# Patient Record
Sex: FEMALE | Race: WHITE | NOT HISPANIC OR LATINO
[De-identification: names, ages, dates, MRNs, and addresses within clinical notes are randomized per-mention and may not be internally consistent; named-entity substitution may affect disease eponyms.]

---

## 2018-05-07 PROBLEM — Z00.00 ENCOUNTER FOR PREVENTIVE HEALTH EXAMINATION: Status: ACTIVE | Noted: 2018-05-07

## 2018-05-08 ENCOUNTER — APPOINTMENT (OUTPATIENT)
Dept: ORTHOPEDIC SURGERY | Facility: CLINIC | Age: 68
End: 2018-05-08
Payer: MEDICARE

## 2018-05-08 ENCOUNTER — OUTPATIENT (OUTPATIENT)
Dept: OUTPATIENT SERVICES | Facility: HOSPITAL | Age: 68
LOS: 1 days | End: 2018-05-08
Payer: MEDICARE

## 2018-05-08 VITALS — HEIGHT: 62 IN | WEIGHT: 138 LBS | OXYGEN SATURATION: 99 % | BODY MASS INDEX: 25.4 KG/M2

## 2018-05-08 PROCEDURE — 99203 OFFICE O/P NEW LOW 30 MIN: CPT | Mod: 25

## 2018-05-08 PROCEDURE — 73562 X-RAY EXAM OF KNEE 3: CPT

## 2018-05-08 PROCEDURE — 20610 DRAIN/INJ JOINT/BURSA W/O US: CPT | Mod: LT

## 2018-05-08 PROCEDURE — 73562 X-RAY EXAM OF KNEE 3: CPT | Mod: 26,50

## 2018-06-03 ENCOUNTER — TRANSCRIPTION ENCOUNTER (OUTPATIENT)
Age: 68
End: 2018-06-03

## 2018-07-10 ENCOUNTER — APPOINTMENT (OUTPATIENT)
Dept: ORTHOPEDIC SURGERY | Facility: CLINIC | Age: 68
End: 2018-07-10
Payer: MEDICARE

## 2018-07-10 VITALS — BODY MASS INDEX: 25.4 KG/M2 | WEIGHT: 138 LBS | HEIGHT: 62 IN

## 2018-07-10 PROCEDURE — 99213 OFFICE O/P EST LOW 20 MIN: CPT

## 2018-08-26 ENCOUNTER — FORM ENCOUNTER (OUTPATIENT)
Age: 68
End: 2018-08-26

## 2018-08-27 ENCOUNTER — OUTPATIENT (OUTPATIENT)
Dept: OUTPATIENT SERVICES | Facility: HOSPITAL | Age: 68
LOS: 1 days | End: 2018-08-27
Payer: MEDICARE

## 2018-08-27 ENCOUNTER — APPOINTMENT (OUTPATIENT)
Dept: ORTHOPEDIC SURGERY | Facility: CLINIC | Age: 68
End: 2018-08-27
Payer: MEDICARE

## 2018-08-27 VITALS — BODY MASS INDEX: 25.4 KG/M2 | HEIGHT: 62 IN | WEIGHT: 138 LBS

## 2018-08-27 PROCEDURE — 73562 X-RAY EXAM OF KNEE 3: CPT | Mod: 26,RT

## 2018-08-27 PROCEDURE — 99213 OFFICE O/P EST LOW 20 MIN: CPT | Mod: 25

## 2018-08-27 PROCEDURE — 73562 X-RAY EXAM OF KNEE 3: CPT

## 2018-08-27 PROCEDURE — 20610 DRAIN/INJ JOINT/BURSA W/O US: CPT | Mod: RT

## 2019-03-26 ENCOUNTER — APPOINTMENT (OUTPATIENT)
Dept: ORTHOPEDIC SURGERY | Facility: CLINIC | Age: 69
End: 2019-03-26
Payer: MEDICARE

## 2019-03-26 VITALS — WEIGHT: 138 LBS | HEIGHT: 62 IN | BODY MASS INDEX: 25.4 KG/M2

## 2019-03-26 PROCEDURE — 99213 OFFICE O/P EST LOW 20 MIN: CPT | Mod: 25

## 2019-03-26 PROCEDURE — 20610 DRAIN/INJ JOINT/BURSA W/O US: CPT | Mod: RT

## 2019-03-26 RX ORDER — DICLOFENAC SODIUM 10 MG/G
1 GEL TOPICAL DAILY
Qty: 2 | Refills: 0 | Status: ACTIVE | COMMUNITY
Start: 2019-03-26 | End: 1900-01-01

## 2019-03-26 RX ORDER — MELOXICAM 15 MG/1
15 TABLET ORAL DAILY
Qty: 30 | Refills: 1 | Status: ACTIVE | COMMUNITY
Start: 2019-03-26 | End: 1900-01-01

## 2019-03-27 NOTE — ASSESSMENT
[FreeTextEntry1] : Assessment\par #1 right knee arthritis, bone-on-bone in the medial compartment\par \par Plan\par #1 right knee was injected as described above\par #2 Voltaren gel and Mobic both sent to pharmacy\par #3 followup in 3 months or sooner p.r.n....

## 2019-03-27 NOTE — PROCEDURE
[de-identified] : After obtaining verbal consent and under the normal sterile conditions the right knee joint was injected with a solution of 4 cc 1% lidocaine and 1 cc of 40 mg per mL of Kenalog. The patient tolerated the procedure well.

## 2019-03-27 NOTE — HISTORY OF PRESENT ILLNESS
[de-identified] : 68-year-old lady following up for her right knee, she had an injection in August she did great until January, the pain has returned and is now radiating into the shin, it's worse with weightbearing ambulation and sitting for greater than 20 minutes moderate to severe at times. Better with rest. Requesting a joint injection today.

## 2019-03-27 NOTE — PHYSICAL EXAM
[de-identified] : General: Not in acute distress, dressed appropriately, sitting on examination table\par Skin: Warm and dry, normal turgor, no rashes\par Neurological: AOx3, Cranial nerves grossly in tact\par Psych: Mood and affect appropriate\par \par Focused Examination of the right knee : \par Erythematous or drainage. Tender medially. Range of motion zero 110. Stable. Assessment strength. Ambulate with a normal gait without devices.

## 2019-07-23 ENCOUNTER — FORM ENCOUNTER (OUTPATIENT)
Age: 69
End: 2019-07-23

## 2019-07-24 ENCOUNTER — APPOINTMENT (OUTPATIENT)
Dept: ORTHOPEDIC SURGERY | Facility: CLINIC | Age: 69
End: 2019-07-24
Payer: MEDICARE

## 2019-07-24 ENCOUNTER — OUTPATIENT (OUTPATIENT)
Dept: OUTPATIENT SERVICES | Facility: HOSPITAL | Age: 69
LOS: 1 days | End: 2019-07-24
Payer: MEDICARE

## 2019-07-24 DIAGNOSIS — M17.10 UNILATERAL PRIMARY OSTEOARTHRITIS, UNSPECIFIED KNEE: ICD-10-CM

## 2019-07-24 PROCEDURE — 73562 X-RAY EXAM OF KNEE 3: CPT | Mod: 26,50

## 2019-07-24 PROCEDURE — 73562 X-RAY EXAM OF KNEE 3: CPT

## 2019-07-24 PROCEDURE — 99213 OFFICE O/P EST LOW 20 MIN: CPT

## 2019-07-25 PROBLEM — M17.10 ARTHRITIS OF KNEE: Status: ACTIVE | Noted: 2018-05-18

## 2019-07-25 NOTE — HISTORY OF PRESENT ILLNESS
[de-identified] : This is a 69-year-old female with right severe knee arthritis. She is here today for followup after a chronic history of knee pain. She states that she has difficulty with walking as well as with stairs. She has tried Voltaren gel which has mildly helped the pain. She also has tried half a tablet of Mobic but she is hesitant and taking anti-inflammatory medications. She also has had an injection by [facility] approximately 5 months ago. She states injection worked for approximately 2 months which was shorter than the previous injection she had. She would like to discuss other options besides surgery.

## 2019-07-25 NOTE — PHYSICAL EXAM
[de-identified] : Physical examination of the right knee demonstrates the patient has 0-130° range of motion. She has medial joint line tenderness on the right side. She has a mild effusion on the right knee compared to the left. Patient is stable to varus and valgus stress. She has no back pain as well as no hip pain with internal/external rotation. Rest of the distal exam is within normal limits [de-identified] : X-rays of the right knee demonstrates the patient has bone-on-bone arthritis of the right knee. She has severe sclerosis as well as joint space narrowing.

## 2019-07-25 NOTE — ASSESSMENT
[FreeTextEntry1] : This is a 69-year-old female with right knee bone-on-bone medial arthritis\par Plan: We are going to continue with anti-inflammatory medications including Mobic she has a supply at home.She's going to try and continue exercises at home. She is going to followup p.r.n.\par \par All medical record entries made by the PA/Scribe/Fellow are at my, Dr. Issa Owens's direction and personally dictated by me on 07/24/2019]. I have reviewed the chart and agree that the record accurately reflects my personal performance of the history, physical exam, assessment, and plan. I have also personally directed reviewed, and agreed with the chart.\par

## 2019-07-30 ENCOUNTER — TRANSCRIPTION ENCOUNTER (OUTPATIENT)
Age: 69
End: 2019-07-30

## 2023-04-18 ENCOUNTER — LABORATORY RESULT (OUTPATIENT)
Age: 73
End: 2023-04-18

## 2023-04-18 ENCOUNTER — APPOINTMENT (OUTPATIENT)
Dept: OBGYN | Facility: CLINIC | Age: 73
End: 2023-04-18
Payer: MEDICARE

## 2023-04-18 VITALS — SYSTOLIC BLOOD PRESSURE: 144 MMHG | BODY MASS INDEX: 24.88 KG/M2 | WEIGHT: 136 LBS | DIASTOLIC BLOOD PRESSURE: 86 MMHG

## 2023-04-18 DIAGNOSIS — Z01.419 ENCOUNTER FOR GYNECOLOGICAL EXAMINATION (GENERAL) (ROUTINE) W/OUT ABNORMAL FINDINGS: ICD-10-CM

## 2023-04-18 LAB
BILIRUB UR QL STRIP: NORMAL
GLUCOSE UR-MCNC: NORMAL
HCG UR QL: 0.2 EU/DL
HGB UR QL STRIP.AUTO: NORMAL
KETONES UR-MCNC: NORMAL
LEUKOCYTE ESTERASE UR QL STRIP: NORMAL
NITRITE UR QL STRIP: POSITIVE
PH UR STRIP: 7
PROT UR STRIP-MCNC: NORMAL
SP GR UR STRIP: 1.02

## 2023-04-18 PROCEDURE — 81003 URINALYSIS AUTO W/O SCOPE: CPT | Mod: QW

## 2023-04-18 PROCEDURE — 99387 INIT PM E/M NEW PAT 65+ YRS: CPT | Mod: GY

## 2023-04-18 PROCEDURE — 99204 OFFICE O/P NEW MOD 45 MIN: CPT | Mod: 25

## 2023-04-18 PROCEDURE — G0101: CPT

## 2023-04-18 RX ORDER — ESTRADIOL 0.1 MG/G
0.1 CREAM VAGINAL
Qty: 60 | Refills: 2 | Status: ACTIVE | COMMUNITY
Start: 2023-04-18 | End: 1900-01-01

## 2023-04-18 NOTE — PLAN
[FreeTextEntry1] : bleeding from sig erosion of the cx and vagina\par to do pelvic sono \par to keep pessary out \par to use estrogen cream\par \par to get pelvic sono \par discussed surgery \par

## 2023-04-18 NOTE — HISTORY OF PRESENT ILLNESS
[FreeTextEntry1] : 72 years old [id number] -5nsvds last one transverse lie c/s \par pt went into menopause at age 51 years \par \par pt dged with prolapse bladder when she was in her 40's  -her gyn then gave her a pessary -overtime the pessary need has gotten worse ie the pessary got larger \par  after gyn retired pt went to gynuro -  14 years ago ,pt not incontinent n a continuos  basis but when really needs to go to the bathroom does wear a pad\par pt started to see dr anderson  4 years ago - pt ws offered surgery and didn’t want it ,she was given estrogen and pt didn’t want to do it \par about 5 weeks she had some staining pt was seen gynuro - told to see gyn since the cx is very raw\par about 1 week ago after relationships pt saw a lot of blood on tissue for about 1 year \par pt takes the pessary nightly - and then sleeps with it \par \par mother breast cancer, colon cancer - maternal aunt breast cancer - \par e/rhernia surgery , knee issue going for knee replacement \par pt has osteoarthritis \par on -losartin for bp, during covid pt had athymia put on metoprolol now off , may go back on it before surgery allergic keflex\par pt is on vit d, calcium \par \par mammo every year - 2022\par colonoscopy - q3-5 years \par \par dexa long time ago \par no pelvic sono \par last blood work - 2022 \par \par \par note from do judd -3/2/2023 \par pt has urethral caruncle , pessary #7 flet not aequate but #8 maybe too large offered surgery or gelhorn , or bulking \par talked about incontinence - overactive bladder , nitrate + nno rx since no symtpoms , constipation, pt didn’t want myrbetriq , suggested PTNS

## 2023-04-18 NOTE — PHYSICAL EXAM
[Labia Majora] : normal [Labia Minora] : normal [Cystocele] : a cystocele [Erythematous] : erythema [Uterine Prolapse] : uterine prolapse [Discharge] : discharge [Green] : green [Normal] : normal [Uterine Adnexae] : normal [FreeTextEntry2] : urethral caruncle [FreeTextEntry4] : sever erthema [FreeTextEntry5] : sever irriation [FreeTextEntry9] : wnl

## 2023-04-19 ENCOUNTER — NON-APPOINTMENT (OUTPATIENT)
Age: 73
End: 2023-04-19

## 2023-04-19 LAB
C TRACH RRNA SPEC QL NAA+PROBE: NOT DETECTED
N GONORRHOEA RRNA SPEC QL NAA+PROBE: NOT DETECTED
SOURCE TP AMPLIFICATION: NORMAL

## 2023-04-26 ENCOUNTER — APPOINTMENT (OUTPATIENT)
Dept: OBGYN | Facility: CLINIC | Age: 73
End: 2023-04-26
Payer: MEDICARE

## 2023-04-26 LAB
CYTOLOGY CVX/VAG DOC THIN PREP: ABNORMAL
HPV HIGH+LOW RISK DNA PNL CVX: NOT DETECTED

## 2023-04-26 PROCEDURE — 81003 URINALYSIS AUTO W/O SCOPE: CPT | Mod: QW

## 2023-05-02 ENCOUNTER — APPOINTMENT (OUTPATIENT)
Dept: OBGYN | Facility: CLINIC | Age: 73
End: 2023-05-02
Payer: MEDICARE

## 2023-05-02 PROCEDURE — 99213 OFFICE O/P EST LOW 20 MIN: CPT

## 2023-05-02 NOTE — HISTORY OF PRESENT ILLNESS
[FreeTextEntry1] : pt came for f/u pt had her pessary out only at night and then she put it in during the day she was using the premairn nightly and then she saw blood again this past sat night , so she took out the pessary and left it iout, she also has started rx for uti with macrobid \par rpt ruine c/s after rx \par after using the premarin with the pessary out she will rt to have vag check

## 2023-05-04 DIAGNOSIS — N81.10 CYSTOCELE, UNSPECIFIED: ICD-10-CM

## 2023-05-16 ENCOUNTER — APPOINTMENT (OUTPATIENT)
Dept: OBGYN | Facility: CLINIC | Age: 73
End: 2023-05-16
Payer: MEDICARE

## 2023-05-16 VITALS — WEIGHT: 137 LBS | SYSTOLIC BLOOD PRESSURE: 155 MMHG | BODY MASS INDEX: 25.06 KG/M2 | DIASTOLIC BLOOD PRESSURE: 96 MMHG

## 2023-05-16 DIAGNOSIS — R30.0 DYSURIA: ICD-10-CM

## 2023-05-16 DIAGNOSIS — N81.4 UTEROVAGINAL PROLAPSE, UNSPECIFIED: ICD-10-CM

## 2023-05-16 PROCEDURE — 81003 URINALYSIS AUTO W/O SCOPE: CPT | Mod: QW

## 2023-05-16 PROCEDURE — 81025 URINE PREGNANCY TEST: CPT

## 2023-05-16 PROCEDURE — 57150 TREAT VAGINA INFECTION: CPT

## 2023-05-16 NOTE — HISTORY OF PRESENT ILLNESS
[FreeTextEntry1] : ptkept the pessary out for about 2 weeks , used the premarin cream nightly \par she has developed a sense of urgency , and dysuria , the urine dip is neg , urine c/s sent \par discussed vaginal surgey to see dr diaz on 5/27/2023 \par the vagina totally healed , pessary put back \par pt cancelled her knee surgery since her pain with symptoms\par pt started with macrobid and didn’t feel any better her pmd switched to amoxicillen \par prolapse noted again pessary put \par pt went to another gynuro - who said md - cant lift cystocele , , dr diaz can do it ,,she was started on the tropsium bladder relaxant

## 2023-05-16 NOTE — REASON FOR VISIT
[Follow-Up] : a follow-up evaluation of [FreeTextEntry2] : pessary replacement , healingvagina replaceing pessary, dysuria

## 2023-05-16 NOTE — PLAN
[FreeTextEntry1] : replaced pessary \par sent urine c/s \par to discuss rx with dr diaz after her visit \par discussed surgery plans after dr diaz to keep pessary out at night \par continue using the premarin 2x/week \par to rt in 1 month to recheck the vagina

## 2023-05-19 ENCOUNTER — APPOINTMENT (OUTPATIENT)
Dept: OBGYN | Facility: CLINIC | Age: 73
End: 2023-05-19
Payer: MEDICARE

## 2023-05-19 DIAGNOSIS — N39.0 URINARY TRACT INFECTION, SITE NOT SPECIFIED: ICD-10-CM

## 2023-05-19 LAB
BACTERIA UR CULT: NORMAL
BILIRUB UR QL STRIP: NORMAL
BILIRUB UR QL STRIP: NORMAL
GLUCOSE UR-MCNC: NORMAL
GLUCOSE UR-MCNC: NORMAL
HCG UR QL: 0.2 EU/DL
HCG UR QL: 0.2 EU/DL
HCG UR QL: NEGATIVE
HGB UR QL STRIP.AUTO: NORMAL
HGB UR QL STRIP.AUTO: NORMAL
KETONES UR-MCNC: NORMAL
KETONES UR-MCNC: NORMAL
LEUKOCYTE ESTERASE UR QL STRIP: NORMAL
LEUKOCYTE ESTERASE UR QL STRIP: NORMAL
NITRITE UR QL STRIP: NORMAL
NITRITE UR QL STRIP: NORMAL
PH UR STRIP: 5.5
PH UR STRIP: 6
PROT UR STRIP-MCNC: NORMAL
PROT UR STRIP-MCNC: NORMAL
SP GR UR STRIP: 1.02
SP GR UR STRIP: 1.03

## 2023-05-19 PROCEDURE — 81003 URINALYSIS AUTO W/O SCOPE: CPT | Mod: QW

## 2023-05-19 PROCEDURE — 99212 OFFICE O/P EST SF 10 MIN: CPT

## 2023-05-19 RX ORDER — SULFAMETHOXAZOLE AND TRIMETHOPRIM 800; 160 MG/1; MG/1
800-160 TABLET ORAL
Qty: 10 | Refills: 0 | Status: ACTIVE | COMMUNITY
Start: 2023-05-19 | End: 1900-01-01

## 2023-05-19 NOTE — DISCUSSION/SUMMARY
[FreeTextEntry1] : pt having dysuria and urinary frequency, came 3 days ago to sent urine cx, the speciman grew 3 organisms, here to repeat,  urinalysis is pos for nitrites will treat empirically.  pt took macorbid 2 wks ago and then followed with amoxil, will start on bactrim,  urine cx sent today

## 2023-06-02 LAB — BACTERIA UR CULT: ABNORMAL

## 2023-06-20 ENCOUNTER — APPOINTMENT (OUTPATIENT)
Dept: OBGYN | Facility: CLINIC | Age: 73
End: 2023-06-20

## 2024-04-11 ENCOUNTER — RX RENEWAL (OUTPATIENT)
Age: 74
End: 2024-04-11

## 2024-04-11 RX ORDER — NYSTATIN AND TRIAMCINOLONE ACETONIDE 100000; 1 MG/G; MG/G
100000-0.1 CREAM TOPICAL TWICE DAILY
Qty: 30 | Refills: 5 | Status: ACTIVE | COMMUNITY
Start: 2023-05-04 | End: 1900-01-01

## 2024-10-29 ENCOUNTER — NON-APPOINTMENT (OUTPATIENT)
Age: 74
End: 2024-10-29